# Patient Record
(demographics unavailable — no encounter records)

---

## 2025-02-04 NOTE — HISTORY OF PRESENT ILLNESS
[FreeTextEntry1] : Dear Dr. Dav Cruz ([facility])  Thank you so much for the referral to help care for your patient. Chief Complaint: elevated PSA, BCG granulomas of the prostate Date of first visit: 01/24/2022 Occupation: Retired   SAIMA HOUSE is a 65 year old  man with PMHx high grade bladder ca s/p BCG 5 years ago who presents for follow up elevated PSA. He underwent a targeted prostate biopsy with the UroNav MRI fusion on 3/24/22. The biopsy was negative for cancer. The patient with a hx of BCG which caused the changes on the MRI. His PSA has ranged from 3-4.6 ng/ml over the past six years. However, the last year it has decreased to 2.8 in Jan 2024.  He was concerned about the cyst on the US. The MRI does have the cyst present.  He denies family hx of  malignancies.  They have been trending his PSA. 1/31/22 Select MDx VLR ExoDx 1/17/2023 27.53 score  Hx bladder cancer being followed by Dr Cruz most recently 2024/jan. Recent cytology and cysto negative and blue light cystoscopy and no signs of recurrence. He is moving to annual cystoscopy. Negative cytology, UA Neg for RBCs.   Prostate cancer screening: the patient and I spoke at length about prostate cancer screening, its risks and its benefits. The patient has (Age, PSA, ROBBIN) 3 risk factors for prostate cancer. He understands that many men with prostate cancer will die with the disease rather than of it and we also discussed the results large multi-center American and  prostate cancer screening trials. He also understands that PSA in and of itself does not diagnose prostate cancer but only assesses risk to a certain degree. The patient understands that to definitively screen for prostate cancer, a biopsy is required and this procedure has risks, including bleeding, infection, ED and urinary retention. The patient opted to MRI but high threshold to rebiopsy since we know the granuloma.  PSA Hx: 4.6 02/03/2025 2.8 1/24 4.3 1/04/23 4.62 9/13/22 3.65 3/15/2022 3.9 1/20/22 repeat 4.58 1/10/22 3.1 ng/ml 11/18  Biopsy Hx 3/24/2022 with Dr Lorenzana 15 cores benign  MRUrogram 7/20/22 - negative for malignancy. Prostate changes c/w with prior MRI.  MRI Hx MRI at [facility] on 03/29 /2023. 40 cc prostate with PIRADS #2 with No MRI targetable lesions. No LAD No EPE, No Bony Lesions. The images have been reviewed and clinical implications discussed with the patient. the cyst is present on MRI that correlates with the RBUS.  MRI at [facility] on 2/2/2022. 37cc prostate with PIRADS 5 lesion measuring 1.7 mm, left apex PZa, PIRADS 4 lesion measuring 11mm, right mid pzpl, PIRADS 4 lesion measuring 5mm, left base pzpl. Two additional low T2 signal lesions are seen at the apex measuring 5mm on the right and 5mm on the left (series 7 image 22). Lesion 1 left apex PZa bulges the capsule deforming the capsule, No LAD, No Bony Lesions. The images have been reviewed and clinical implications discussed with the patient.  MRI at [facility] on 1/5/2022. 5.1 x 3.8 x 4.8; volume 42.2 cc prostate with PIRADS 4 lesion measuring 1.0 x 0.8 cm T1 hypointense, T2 hypointense lesion with significant diffusion restriction and mild early arterial enhancement, left PZa (series 6 image 21). There is another 1.1 cm lesion without diffusion restriction or enhancement (series 6, image 18) likely representing proteinaceous cyst, right apex pzpl. There is another lesion measuring 1.1cm without restriction or enhancement immediately medial to previous lesion, likely representing a scar (series 6 image 15). No LAD No EPE, No Bony Lesions. The images have been reviewed and clinical implications discussed with the patient.  MRI at [facility] on 10/20/18- 38.3cc no suspicious lesions  MRI at [facility] on 4/2/2018. 24 cc prostate with PIRADS 4 lesion measuring 10 x 9 mm, left apex PZa (image 7 series 11), PIRADS 4 lesion measuring 13x9mm, right mid pzpl, PIRADS 4 lesion measuring 8x7mm, right apex pzpl (image 10 series 11), PIRADS 4 lesion measuring 6x4mm (image 7 series 11), left apex pzpm (image 7 series 11). No LAD No EPE, No Bony Lesions.   The patient underwent a follow-up MRI dated April 2, 2018. There is the prostate volume was 24 mL there were for lesions as per report PI-RADS four. However none of these lesions enhance. Which lowers my suspicion for prostate cancer. 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 The patient is asymptomatic with no fevers and or chills. Therefore would not recommend treating these. However there is still a possibility the patient may have underlying prostate cancer that is not visualized. However given the fact the patient's PSA/ROBBIN was normal prior to BCG therapy follow-up imaging demonstrated these for lesions which could be consistent with post-BCG granulomas.   [facility] REVIEW MRI dated 10/30/18 - 38.3cc - there are no suspicious lesions- the areas previous called were non enhancing and have not changed. There are diffuse areas of the prostate that enhance there are no corresponding areas on T2 or ADC. Which lowers suspicion of prostate cancer. The overall score was 3/5 with no discrete lesions - therefore it is choice to have a biopsy or not.  02/11/2025 IPSS      QOL NAEL  02/13/2024 IPSS QOL NAEL  04/11/2023 IPSS QOL NAEL  02/14/2023 IPSS 1 QOL 0 NAEL 24  01/24/2022 IPSS 2 QOL 0 NAEL NR   The patient denies fevers, chills, nausea and or vomiting and no unexplained weight loss.  All pertinent laboratory, films and physician notes were reviewed. Questionnaire results were discussed with patient.  I spent 31 minutes of non-face-to-face time reviewing the patient's records chart, uploading processing MR images, transferring MR images from PACS to an independent workstation, reviewing images on an independent workstation, speaking with their physician and planning their prostate biopsy and preparation of an upcoming visit for 02/13/2024 . The imaging and planning was highly complex and took this entire time.

## 2025-02-04 NOTE — HISTORY OF PRESENT ILLNESS
[FreeTextEntry1] : Dear Dr. Dav Cruz ([facility])  Thank you so much for the referral to help care for your patient. Chief Complaint: elevated PSA, BCG granulomas of the prostate Date of first visit: 01/24/2022 Occupation: Retired   SAIMA HOUSE is a 65 year old  man with PMHx high grade bladder ca s/p BCG 5 years ago who presents for follow up elevated PSA. He underwent a targeted prostate biopsy with the UroNav MRI fusion on 3/24/22. The biopsy was negative for cancer. The patient with a hx of BCG which caused the changes on the MRI. His PSA has ranged from 3-4.6 ng/ml over the past six years. However, the last year it has decreased to 2.8 in Jan 2024.  He was concerned about the cyst on the US. The MRI does have the cyst present.  He denies family hx of  malignancies.  They have been trending his PSA. 1/31/22 Select MDx VLR ExoDx 1/17/2023 27.53 score  Hx bladder cancer being followed by Dr Cruz most recently 2024/jan. Recent cytology and cysto negative and blue light cystoscopy and no signs of recurrence. He is moving to annual cystoscopy. Negative cytology, UA Neg for RBCs.   Prostate cancer screening: the patient and I spoke at length about prostate cancer screening, its risks and its benefits. The patient has (Age, PSA, ROBBIN) 3 risk factors for prostate cancer. He understands that many men with prostate cancer will die with the disease rather than of it and we also discussed the results large multi-center American and  prostate cancer screening trials. He also understands that PSA in and of itself does not diagnose prostate cancer but only assesses risk to a certain degree. The patient understands that to definitively screen for prostate cancer, a biopsy is required and this procedure has risks, including bleeding, infection, ED and urinary retention. The patient opted to MRI but high threshold to rebiopsy since we know the granuloma.  PSA Hx: 4.6 02/03/2025 2.8 1/24 4.3 1/04/23 4.62 9/13/22 3.65 3/15/2022 3.9 1/20/22 repeat 4.58 1/10/22 3.1 ng/ml 11/18  Biopsy Hx 3/24/2022 with Dr Lorenzana 15 cores benign  MRUrogram 7/20/22 - negative for malignancy. Prostate changes c/w with prior MRI.  MRI Hx MRI at [facility] on 03/29 /2023. 40 cc prostate with PIRADS #2 with No MRI targetable lesions. No LAD No EPE, No Bony Lesions. The images have been reviewed and clinical implications discussed with the patient. the cyst is present on MRI that correlates with the RBUS.  MRI at [place] on 2/2/2022. 37cc prostate with PIRADS 5 lesion measuring 1.7 mm, left apex PZa, PIRADS 4 lesion measuring 11mm, right mid pzpl, PIRADS 4 lesion measuring 5mm, left base pzpl. Two additional low T2 signal lesions are seen at the apex measuring 5mm on the right and 5mm on the left (series 7 image 22). Lesion 1 left apex PZa bulges the capsule deforming the capsule, No LAD, No Bony Lesions. The images have been reviewed and clinical implications discussed with the patient.  MRI at [facility] on 1/5/2022. 5.1 x 3.8 x 4.8; volume 42.2 cc prostate with PIRADS 4 lesion measuring 1.0 x 0.8 cm T1 hypointense, T2 hypointense lesion with significant diffusion restriction and mild early arterial enhancement, left PZa (series 6 image 21). There is another 1.1 cm lesion without diffusion restriction or enhancement (series 6, image 18) likely representing proteinaceous cyst, right apex pzpl. There is another lesion measuring 1.1cm without restriction or enhancement immediately medial to previous lesion, likely representing a scar (series 6 image 15). No LAD No EPE, No Bony Lesions. The images have been reviewed and clinical implications discussed with the patient.  MRI at [facility] on 10/20/18- 38.3cc no suspicious lesions  MRI at [facility] on 4/2/2018. 24 cc prostate with PIRADS 4 lesion measuring 10 x 9 mm, left apex PZa (image 7 series 11), PIRADS 4 lesion measuring 13x9mm, right mid pzpl, PIRADS 4 lesion measuring 8x7mm, right apex pzpl (image 10 series 11), PIRADS 4 lesion measuring 6x4mm (image 7 series 11), left apex pzpm (image 7 series 11). No LAD No EPE, No Bony Lesions.   The patient underwent a follow-up MRI dated April 2, 2018. There is the prostate volume was 24 mL there were for lesions as per report PI-RADS four. However none of these lesions enhance. Which lowers my suspicion for prostate cancer. 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 The patient is asymptomatic with no fevers and or chills. Therefore would not recommend treating these. However there is still a possibility the patient may have underlying prostate cancer that is not visualized. However given the fact the patient's PSA/ROBBIN was normal prior to BCG therapy follow-up imaging demonstrated these for lesions which could be consistent with post-BCG granulomas.   [facility] REVIEW MRI dated 10/30/18 - 38.3cc - there are no suspicious lesions- the areas previous called were non enhancing and have not changed. There are diffuse areas of the prostate that enhance there are no corresponding areas on T2 or ADC. Which lowers suspicion of prostate cancer. The overall score was 3/5 with no discrete lesions - therefore it is choice to have a biopsy or not.  02/11/2025 IPSS      QOL NAEL  02/13/2024 IPSS QOL NAEL  04/11/2023 IPSS QOL NAEL  02/14/2023 IPSS 1 QOL 0 NAEL 24  01/24/2022 IPSS 2 QOL 0 NAEL NR   The patient denies fevers, chills, nausea and or vomiting and no unexplained weight loss.  All pertinent laboratory, films and physician notes were reviewed. Questionnaire results were discussed with patient.  I spent 31 minutes of non-face-to-face time reviewing the patient's records chart, uploading processing MR images, transferring MR images from PACS to an independent workstation, reviewing images on an independent workstation, speaking with their physician and planning their prostate biopsy and preparation of an upcoming visit for 02/13/2024 . The imaging and planning was highly complex and took this entire time.

## 2025-02-04 NOTE — ASSESSMENT
[FreeTextEntry1] : 64 yo male with elevated PSA 2.8 ng/ml, MRI with PIRADS 4 lesion left apex PZa, previous 2018 MRI negative (+granulomatous prostatitis s/p BCG),no prior biopsy, neg FH, neg ROBBIN.  Hold off on biopsy at this time given no change in PSA, normal PSAD and hx of BCG- I have reviewed the three MRIs - the most recent SB was limited by SNR was low and the DCE was not as clear as the MSSM  Elevated PSA - Trending Down - MRI at [place] 2023- demonstrated lesions stable MSSM and no enhancement. - Trend PSA yearly, thoug pt prefers to have checked in 9 months (will get at [facility], requisition given) - MRI in 3 years (due March 2026) - prostate cyst is shrinking on MRI and not suspicious for prostate cancer. (on MRI always the way back to 2018)  Prior TCC of the Bladder s/p BCG and negative for recurrence. - Follows at [place] for surveillance - which has been negative to date (Jan24)  1 year follow up  Thank you very much for allowing me to assist in the care of this patient. Please do not hesitate to contact me with any additional questions or concerns.

## 2025-02-04 NOTE — ASSESSMENT
[FreeTextEntry1] : 64 yo male with elevated PSA 2.8 ng/ml, MRI with PIRADS 4 lesion left apex PZa, previous 2018 MRI negative (+granulomatous prostatitis s/p BCG),no prior biopsy, neg FH, neg ROBBIN.  Hold off on biopsy at this time given no change in PSA, normal PSAD and hx of BCG- I have reviewed the three MRIs - the most recent SB was limited by SNR was low and the DCE was not as clear as the MSSM  Elevated PSA - Trending Down - MRI at [place] 2023- demonstrated lesions stable MSSM and no enhancement. - Trend PSA yearly, thoug pt prefers to have checked in 9 months (will get at [facility], requisition given) - MRI in 3 years (due March 2026) - prostate cyst is shrinking on MRI and not suspicious for prostate cancer. (on MRI always the way back to 2018)  Prior TCC of the Bladder s/p BCG and negative for recurrence. - Follows at [place] for surveillance - which has been negative to date (Jan24)  1 year follow up  Thank you very much for allowing me to assist in the care of this patient. Please do not hesitate to contact me with any additional questions or concerns.

## 2025-06-10 NOTE — HISTORY OF PRESENT ILLNESS
[FreeTextEntry1] : Dear Dr. Dav Cruz ([facility])  Thank you so much for the referral to help care for your patient. Chief Complaint: elevated PSA, BCG granulomas of the prostate Date of first visit: 01/24/2022 Occupation: Retired   SAIMA HOUSE is a 65-year-old  man with PMHx high grade bladder ca s/p BCG 5 years ago who presents for follow up elevated PSA. He underwent a targeted prostate biopsy with the UroNav MRI fusion on 3/24/22. The biopsy was negative for cancer. The patient with a hx of BCG which caused the changes on the MRI. He denies family hx of  malignancies. 1/31/22 Select MDx VLR ExoDx 1/17/2023 27.53 score  Hx bladder cancer being followed by Dr Cruz. Recent cytology and cysto negative and blue light cystoscopy and no signs of recurrence. He is moving to annual cystoscopy. Negative cytology, UA Neg for RBCs.  Prostate cancer 10screening: the patient and I spoke at length about prostate cancer screening, its risks and its benefits. The patient has (Age, PSA, ROBBIN) 3 risk factors for prostate cancer. He understands that many men with prostate cancer will die with the disease rather than of it and we also discussed the results large multi-center American and  prostate cancer screening trials. He also understands that PSA in and of itself does not diagnose prostate cancer but only assesses risk to a certain degree. The patient understands that to definitively screen for prostate cancer, a biopsy is required and this procedure has risks, including bleeding, infection, ED and urinary retention. The patient opted to MRI but high threshold to rebiopsy since we know the granuloma.  Biopsy Hx 3/24/2022 with Dr Lorenzana 15 cores benign  MRUrogram 7/20/22 - negative for malignancy. Prostate changes c/w with prior MRI.  Renal and Bladder US 4/29/25 40 cc prostate, 0.5cm echogenic focus in left kidney which may reflect AML vs a nonobstructing stone  MRI Hx MRI at [facility] on 6/4/25 36cc prostate, no MRI targetable lesion, PIRADS 2, PRECISE 3, 3 stable lesions since 3/29/23, decreased in size since 2/2/22 c/w granulomatous prostatitis, No EPE. No LAD and no bony lesions. The images have been reviewed and clinical implications discussed with the patient.  MRI at [facility] on 03/29 /2023. 40 cc prostate with PIRADS #2 with No MRI targetable lesions. No LAD No EPE, No Bony Lesions. The images have been reviewed and clinical implications discussed with the patient. the cyst is present on MRI that correlates with the RBUS.  MRI at [place] on 2/2/2022. 37cc prostate with PIRADS 5 lesion measuring 1.7 mm, left apex PZa, PIRADS 4 lesion measuring 11mm, right mid pzpl, PIRADS 4 lesion measuring 5mm, left base pzpl. Two additional low T2 signal lesions are seen at the apex measuring 5mm on the right and 5mm on the left (series 7 image 22). Lesion 1 left apex PZa bulges the capsule deforming the capsule, No LAD, No Bony Lesions. The images have been reviewed and clinical implications discussed with the patient.  MRI at [facility] on 1/5/2022. 5.1 x 3.8 x 4.8; volume 42.2 cc prostate with PIRADS 4 lesion measuring 1.0 x 0.8 cm T1 hypointense, T2 hypointense lesion with significant diffusion restriction and mild early arterial enhancement, left PZa (series 6 image 21). There is another 1.1 cm lesion without diffusion restriction or enhancement (series 6, image 18) likely representing proteinaceous cyst, right apex pzpl. There is another lesion measuring 1.1cm without restriction or enhancement immediately medial to previous lesion, likely representing a scar (series 6 image 15). No LAD No EPE, No Bony Lesions. The images have been reviewed and clinical implications discussed with the patient.  MRI at [facility] on 10/20/18- 38.3cc no suspicious lesions  MRI at [facility] on 4/2/2018. 24 cc prostate with PIRADS 4 lesion measuring 10 x 9 mm, left apex PZa (image 7 series 11), PIRADS 4 lesion measuring 13x9mm, right mid pzpl, PIRADS 4 lesion measuring 8x7mm, right apex pzpl (image 10 series 11), PIRADS 4 lesion measuring 6x4mm (image 7 series 11), left apex pzpm (image 7 series 11). No LAD No EPE, No Bony Lesions.  The patient underwent a follow-up MRI dated April 2, 2018. There is the prostate volume was 24 mL there were for lesions as per report PI-RADS four. However none of these lesions enhance. Which lowers my suspicion for prostate cancer. 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 The patient is asymptomatic with no fevers and or chills. Therefore would not recommend treating these. However there is still a possibility the patient may have underlying prostate cancer that is not visualized. However given the fact the patient's PSA/ROBBIN was normal prior to BCG therapy follow-up imaging demonstrated these for lesions which could be consistent with post-BCG granulomas.  [facility] REVIEW MRI dated 10/30/18 - 38.3cc - there are no suspicious lesions- the areas previous called were non enhancing and have not changed. There are diffuse areas of the prostate that enhance there are no corresponding areas on T2 or ADC. Which lowers suspicion of prostate cancer. The overall score was 3/5 with no discrete lesions - therefore it is choice to have a biopsy or not.  6/9/25 IPSS QOL  NAEL   02/13/2024 IPSS1 QOL NAEL 22  04/11/2023 IPSS QOL NAEL  02/14/2023 IPSS 1 QOL 0 NAEL 24  01/24/2022 IPSS 2 QOL 0 NAEL NR  PSA Hx: - PSA 4/26: 5.0 4.6 02/03/2025 2.8 1/24 4.3 1/04/23 4.62 9/13/22 3.65 3/15/2022 3.9 1/20/22 repeat 4.58 1/10/22 3.1 ng/ml 11/18  The patient denies fevers, chills, nausea and or vomiting and no unexplained weight loss.  All pertinent laboratory, films and physician notes were reviewed. Questionnaire results were discussed with patient.  I spent 31 minutes of non-face-to-face time reviewing the patient's records chart, uploading processing MR images, transferring MR images from PACS to an independent workstation, reviewing images on an independent workstation, speaking with their physician and planning their prostate biopsy and preparation of an upcoming visit for 06/10/2025 .  The imaging and planning was highly complex and took this entire time.

## 2025-06-10 NOTE — ASSESSMENT
[FreeTextEntry1] : 66 yo male with elevated, MRI with PIRADS 4 lesion left apex PZa, previous 2018 MRI negative (+granulomatous prostatitis s/p BCG), neg FH, neg ROBBIN.  Hold off on biopsy at this time given no change in PSA, normal PSAD and hx of BCG- I have reviewed the three MRIs - the most recent SB was limited by SNR was low and the DCE was not as clear as the MSSM  Elevated PSA - BIopsy 3/22: negative - MRI in 3 years (due March 2026) (ordered) - prostate cyst is shrinking on MRI and not suspicious for prostate cancer. (on MRI always the way back to 2018) - PSA 2/25: 4.2 - PSA 4/25: 5.0 - MRI 6/4/2025: PRECISE 3, compatible with granulomatous prostatitis - consider biopsy if PSA increase by 20% - PSA Q6 months, follow up in 1 year  Prior TCC of the Bladder s/p BCG and negative for recurrence. - Follows at [place] for surveillance - which has been negative to date (Jan24) - 5/25 negative cysto  left renal focus on US - 0.5cm echogenic focus in left kidney which may reflect AML vs a non-obstructing stone  Thank you very much for allowing me to assist in the care of this patient. Please do not hesitate to contact me with any additional questions or concerns.  Sincerely,  Mars Lorenzana D.O. Professor of Urology and Radiology  of Urology at [facility] Director for Prostate Cancer [address] [place] [address], 5th Floor [facility], [facility] Phone: [phone]   I saw and examined/evaluated the patient with the resident ((Anjana Randolph MD (PGY [id number])) discussed w/ resident and agree with findings.

## 2025-06-10 NOTE — ASSESSMENT
[FreeTextEntry1] : 66 yo male with elevated, MRI with PIRADS 4 lesion left apex PZa, previous 2018 MRI negative (+granulomatous prostatitis s/p BCG), neg FH, neg ROBBIN.  Hold off on biopsy at this time given no change in PSA, normal PSAD and hx of BCG- I have reviewed the three MRIs - the most recent SB was limited by SNR was low and the DCE was not as clear as the MSSM  Elevated PSA - BIopsy 3/22: negative - MRI in 3 years (due March 2026) (ordered) - prostate cyst is shrinking on MRI and not suspicious for prostate cancer. (on MRI always the way back to 2018) - PSA 2/25: 4.2 - PSA 4/25: 5.0 - MRI 6/4/2025: PRECISE 3, compatible with granulomatous prostatitis - consider biopsy if PSA increase by 20% - PSA Q6 months, follow up in 1 year  Prior TCC of the Bladder s/p BCG and negative for recurrence. - Follows at [facility] for surveillance - which has been negative to date (Jan24) - 5/25 negative cysto  left renal focus on US - 0.5cm echogenic focus in left kidney which may reflect AML vs a non-obstructing stone  Thank you very much for allowing me to assist in the care of this patient. Please do not hesitate to contact me with any additional questions or concerns.  Sincerely,  Mars Lorenzana D.O. Professor of Urology and Radiology  of Urology at [facility] Director for Prostate Cancer [address] [place] [address], 5th Floor [facility], [facility]– [place] Phone: [phone]   I saw and examined/evaluated the patient with the resident ((Anjana Randolph MD (PGY 6)) discussed w/ resident and agree with findings.

## 2025-06-10 NOTE — HISTORY OF PRESENT ILLNESS
[FreeTextEntry1] : Dear Dr. Dav Cruz ([facility])  Thank you so much for the referral to help care for your patient. Chief Complaint: elevated PSA, BCG granulomas of the prostate Date of first visit: 01/24/2022 Occupation: Retired   SAIMA HOUSE is a 65-year-old  man with PMHx high grade bladder ca s/p BCG 5 years ago who presents for follow up elevated PSA. He underwent a targeted prostate biopsy with the UroNav MRI fusion on 3/24/22. The biopsy was negative for cancer. The patient with a hx of BCG which caused the changes on the MRI. He denies family hx of  malignancies. 1/31/22 Select MDx VLR ExoDx 1/17/2023 27.53 score  Hx bladder cancer being followed by Dr Cruz. Recent cytology and cysto negative and blue light cystoscopy and no signs of recurrence. He is moving to annual cystoscopy. Negative cytology, UA Neg for RBCs.  Prostate cancer 10screening: the patient and I spoke at length about prostate cancer screening, its risks and its benefits. The patient has (Age, PSA, ROBBIN) 3 risk factors for prostate cancer. He understands that many men with prostate cancer will die with the disease rather than of it and we also discussed the results large multi-center American and  prostate cancer screening trials. He also understands that PSA in and of itself does not diagnose prostate cancer but only assesses risk to a certain degree. The patient understands that to definitively screen for prostate cancer, a biopsy is required and this procedure has risks, including bleeding, infection, ED and urinary retention. The patient opted to MRI but high threshold to rebiopsy since we know the granuloma.  Biopsy Hx 3/24/2022 with Dr Lorenzana 15 cores benign  MRUrogram 7/20/22 - negative for malignancy. Prostate changes c/w with prior MRI.  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 No LAD and no bony lesions. The images have been reviewed and clinical implications discussed with the patient.  MRI at [facility] on 03/29 /2023. 40 cc prostate with PIRADS #2 with No MRI targetable lesions. No LAD No EPE, No Bony Lesions. The images have been reviewed and clinical implications discussed with the patient. the cyst is present on MRI that correlates with the RBUS.  MRI at [place] on 2/2/2022. 37cc prostate with PIRADS 5 lesion measuring 1.7 mm, left apex PZa, PIRADS 4 lesion measuring 11mm, right mid pzpl, PIRADS 4 lesion measuring 5mm, left base pzpl. Two additional low T2 signal lesions are seen at the apex measuring 5mm on the right and 5mm on the left (series 7 image 22). Lesion 1 left apex PZa bulges the capsule deforming the capsule, No LAD, No Bony Lesions. The images have been reviewed and clinical implications discussed with the patient.  MRI at [facility] on 1/5/2022. 5.1 x 3.8 x 4.8; volume 42.2 cc prostate with PIRADS 4 lesion measuring 1.0 x 0.8 cm T1 hypointense, T2 hypointense lesion with significant diffusion restriction and mild early arterial enhancement, left PZa (series 6 image 21). There is another 1.1 cm lesion without diffusion restriction or enhancement (series 6, image 18) likely representing proteinaceous cyst, right apex pzpl. There is another lesion measuring 1.1cm without restriction or enhancement immediately medial to previous lesion, likely representing a scar (series 6 image 15). No LAD No EPE, No Bony Lesions. The images have been reviewed and clinical implications discussed with the patient.  MRI at [facility] on 10/20/18- 38.3cc no suspicious lesions  MRI at [facility] on 4/2/2018. 24 cc prostate with PIRADS 4 lesion measuring 10 x 9 mm, left apex PZa (image 7 series 11), PIRADS 4 lesion measuring 13x9mm, right mid pzpl, PIRADS 4 lesion measuring 8x7mm, right apex pzpl (image 10 series 11), PIRADS 4 lesion measuring 6x4mm (image 7 series 11), left apex pzpm (image 7 series 11). No LAD No EPE, No Bony Lesions.  The patient underwent a follow-up MRI dated April 2, 2018. There is the prostate volume was 24 mL there were for lesions as per report PI-RADS four. However none of these lesions enhance. Which lowers my suspicion for prostate cancer. 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 The patient is asymptomatic with no fevers and or chills. Therefore would not recommend treating these. However there is still a possibility the patient may have underlying prostate cancer that is not visualized. However given the fact the patient's PSA/ROBBIN was normal prior to BCG therapy follow-up imaging demonstrated these for lesions which could be consistent with post-BCG granulomas.  [facility] REVIEW MRI dated 10/30/18 - 38.3cc - there are no suspicious lesions- the areas previous called were non enhancing and have not changed. There are diffuse areas of the prostate that enhance there are no corresponding areas on T2 or ADC. Which lowers suspicion of prostate cancer. The overall score was 3/5 with no discrete lesions - therefore it is choice to have a biopsy or not.  6/9/25 IPSS QOL  NAEL   02/13/2024 IPSS1 QOL NAEL 22  04/11/2023 IPSS QOL NAEL  02/14/2023 IPSS 1 QOL 0 NAEL 24  01/24/2022 IPSS 2 QOL 0 NAEL NR  PSA Hx: - PSA 4/26: 5.0 4.6 02/03/2025 2.8 1/24 4.3 1/04/23 4.62 9/13/22 3.65 3/15/2022 3.9 1/20/22 repeat 4.58 1/10/22 3.1 ng/ml 11/18  The patient denies fevers, chills, nausea and or vomiting and no unexplained weight loss.  All pertinent laboratory, films and physician notes were reviewed. Questionnaire results were discussed with patient.  I spent 31 minutes of non-face-to-face time reviewing the patient's records chart, uploading processing MR images, transferring MR images from PACS to an independent workstation, reviewing images on an independent workstation, speaking with their physician and planning their prostate biopsy and preparation of an upcoming visit for 06/10/2025 .  The imaging and planning was highly complex and took this entire time.